# Patient Record
Sex: MALE | Race: WHITE | NOT HISPANIC OR LATINO | Employment: FULL TIME | URBAN - METROPOLITAN AREA
[De-identification: names, ages, dates, MRNs, and addresses within clinical notes are randomized per-mention and may not be internally consistent; named-entity substitution may affect disease eponyms.]

---

## 2024-03-25 ENCOUNTER — OFFICE VISIT (OUTPATIENT)
Dept: FAMILY MEDICINE CLINIC | Facility: CLINIC | Age: 54
End: 2024-03-25
Payer: COMMERCIAL

## 2024-03-25 ENCOUNTER — LAB (OUTPATIENT)
Dept: LAB | Facility: HOSPITAL | Age: 54
End: 2024-03-25
Payer: COMMERCIAL

## 2024-03-25 VITALS
SYSTOLIC BLOOD PRESSURE: 136 MMHG | OXYGEN SATURATION: 97 % | TEMPERATURE: 97.8 F | HEIGHT: 72 IN | BODY MASS INDEX: 35.49 KG/M2 | WEIGHT: 262 LBS | HEART RATE: 81 BPM | DIASTOLIC BLOOD PRESSURE: 98 MMHG

## 2024-03-25 DIAGNOSIS — R74.01 ELEVATED ALT MEASUREMENT: ICD-10-CM

## 2024-03-25 DIAGNOSIS — K42.9 UMBILICAL HERNIA WITHOUT OBSTRUCTION AND WITHOUT GANGRENE: ICD-10-CM

## 2024-03-25 DIAGNOSIS — R03.0 ELEVATED BLOOD PRESSURE READING: ICD-10-CM

## 2024-03-25 DIAGNOSIS — R00.2 PALPITATIONS: ICD-10-CM

## 2024-03-25 DIAGNOSIS — D17.9 MULTIPLE LIPOMAS: Primary | ICD-10-CM

## 2024-03-25 PROBLEM — R22.1 NECK MASS: Status: ACTIVE | Noted: 2024-03-25

## 2024-03-25 LAB
ALBUMIN SERPL-MCNC: 4.5 G/DL (ref 3.5–5.2)
ALBUMIN/GLOB SERPL: 1.6 G/DL
ALP SERPL-CCNC: 97 U/L (ref 39–117)
ALT SERPL W P-5'-P-CCNC: 64 U/L (ref 1–41)
ANION GAP SERPL CALCULATED.3IONS-SCNC: 14.4 MMOL/L (ref 5–15)
AST SERPL-CCNC: 30 U/L (ref 1–40)
BILIRUB SERPL-MCNC: 0.5 MG/DL (ref 0–1.2)
BUN SERPL-MCNC: 12 MG/DL (ref 6–20)
BUN/CREAT SERPL: 10.5 (ref 7–25)
CALCIUM SPEC-SCNC: 10 MG/DL (ref 8.6–10.5)
CHLORIDE SERPL-SCNC: 104 MMOL/L (ref 98–107)
CO2 SERPL-SCNC: 25.6 MMOL/L (ref 22–29)
CREAT SERPL-MCNC: 1.14 MG/DL (ref 0.76–1.27)
DEPRECATED RDW RBC AUTO: 40.4 FL (ref 37–54)
EGFRCR SERPLBLD CKD-EPI 2021: 76 ML/MIN/1.73
ERYTHROCYTE [DISTWIDTH] IN BLOOD BY AUTOMATED COUNT: 12.7 % (ref 12.3–15.4)
GLOBULIN UR ELPH-MCNC: 2.8 GM/DL
GLUCOSE SERPL-MCNC: 96 MG/DL (ref 65–99)
HCT VFR BLD AUTO: 46.4 % (ref 37.5–51)
HGB BLD-MCNC: 16 G/DL (ref 13–17.7)
MCH RBC QN AUTO: 30.2 PG (ref 26.6–33)
MCHC RBC AUTO-ENTMCNC: 34.5 G/DL (ref 31.5–35.7)
MCV RBC AUTO: 87.5 FL (ref 79–97)
PLATELET # BLD AUTO: 170 10*3/MM3 (ref 140–450)
PMV BLD AUTO: 11.6 FL (ref 6–12)
POTASSIUM SERPL-SCNC: 4.7 MMOL/L (ref 3.5–5.2)
PROT SERPL-MCNC: 7.3 G/DL (ref 6–8.5)
RBC # BLD AUTO: 5.3 10*6/MM3 (ref 4.14–5.8)
SODIUM SERPL-SCNC: 144 MMOL/L (ref 136–145)
T4 FREE SERPL-MCNC: 1.27 NG/DL (ref 0.93–1.7)
TSH SERPL DL<=0.05 MIU/L-ACNC: 2.41 UIU/ML (ref 0.27–4.2)
WBC NRBC COR # BLD AUTO: 8.15 10*3/MM3 (ref 3.4–10.8)

## 2024-03-25 PROCEDURE — 93000 ELECTROCARDIOGRAM COMPLETE: CPT

## 2024-03-25 PROCEDURE — 84439 ASSAY OF FREE THYROXINE: CPT

## 2024-03-25 PROCEDURE — 85027 COMPLETE CBC AUTOMATED: CPT

## 2024-03-25 PROCEDURE — 80053 COMPREHEN METABOLIC PANEL: CPT

## 2024-03-25 PROCEDURE — 84443 ASSAY THYROID STIM HORMONE: CPT

## 2024-03-25 NOTE — ASSESSMENT & PLAN NOTE
Ordered ultrasound of the soft tissue of neck over CT for less radiation to the patient.  Will refer to general surgeon for lipoma removals.

## 2024-03-25 NOTE — ASSESSMENT & PLAN NOTE
Have ordered ultrasound for evaluation of fatty tumor on posterior left neck.  Would like to make sure there is no neurovascular compromise.  Will refer to general surgery for discussion of possible removal.

## 2024-03-25 NOTE — ASSESSMENT & PLAN NOTE
Could be related to whitecoat hypertension.  Patient advised to monitor at home at least 3-4 times per week.  Patient to notify me if readings greater than 130/80 consistently.  Would like patient to return in 1 month for follow-up regarding blood pressure.  If you begin to have chest pain, shortness of breath patient to seek treatment sooner in the ER.  Verbalized understanding and agreed to the plan.

## 2024-03-25 NOTE — ASSESSMENT & PLAN NOTE
Normal EKG in the office today with irregularities.  With family history of atrial fibrillation in mother would like the patient to see cardiology for evaluation and possible Holter monitor.  Patient to notify me of any new or changing symptoms.

## 2024-03-25 NOTE — ASSESSMENT & PLAN NOTE
Will refer to general surgery for this as well.  Advised patient if he begins to have any pain, redness, swelling, warmth, changes in bowel movements to seek care sooner in the ER.  They verbalized understanding and agreed to this plan.

## 2024-03-25 NOTE — PATIENT INSTRUCTIONS
Exercising to Lose Weight  Getting regular exercise is important for everyone. It is especially important if you are overweight. Being overweight increases your risk of heart disease, stroke, diabetes, high blood pressure, and several types of cancer. Exercising, and reducing the calories you consume, can help you lose weight and improve fitness and health.  Exercise can be moderate or vigorous intensity. To lose weight, most people need to do a certain amount of moderate or vigorous-intensity exercise each week.  How can exercise affect me?  You lose weight when you exercise enough to burn more calories than you eat. Exercise also reduces body fat and builds muscle. The more muscle you have, the more calories you burn. Exercise also:  Improves mood.  Reduces stress and tension.  Improves your overall fitness, flexibility, and endurance.  Increases bone strength.  Moderate-intensity exercise    Moderate-intensity exercise is any activity that gets you moving enough to burn at least three times more energy (calories) than if you were sitting.  Examples of moderate exercise include:  Walking a mile in 15 minutes.  Doing light yard work.  Biking at an easy pace.  Most people should get at least 150 minutes of moderate-intensity exercise a week to maintain their body weight.  Vigorous-intensity exercise  Vigorous-intensity exercise is any activity that gets you moving enough to burn at least six times more calories than if you were sitting. When you exercise at this intensity, you should be working hard enough that you are not able to carry on a conversation.  Examples of vigorous exercise include:  Running.  Playing a team sport, such as football, basketball, and soccer.  Jumping rope.  Most people should get at least 75 minutes a week of vigorous exercise to maintain their body weight.  What actions can I take to lose weight?  The amount of exercise you need to lose weight depends on:  Your age.  The type of  exercise.  Any health conditions you have.  Your overall physical ability.  Talk to your health care provider about how much exercise you need and what types of activities are safe for you.  Nutrition    Make changes to your diet as told by your health care provider or diet and nutrition specialist (dietitian). This may include:  Eating fewer calories.  Eating more protein.  Eating less unhealthy fats.  Eating a diet that includes fresh fruits and vegetables, whole grains, low-fat dairy products, and lean protein.  Avoiding foods with added fat, salt, and sugar.  Drink plenty of water while you exercise to prevent dehydration or heat stroke.  Activity  Choose an activity that you enjoy and set realistic goals. Your health care provider can help you make an exercise plan that works for you.  Exercise at a moderate or vigorous intensity most days of the week.  The intensity of exercise may vary from person to person. You can tell how intense a workout is for you by paying attention to your breathing and heartbeat. Most people will notice their breathing and heartbeat get faster with more intense exercise.  Do resistance training twice each week, such as:  Push-ups.  Sit-ups.  Lifting weights.  Using resistance bands.  Getting short amounts of exercise can be just as helpful as long, structured periods of exercise. If you have trouble finding time to exercise, try doing these things as part of your daily routine:  Get up, stretch, and walk around every 30 minutes throughout the day.  Go for a walk during your lunch break.  Park your car farther away from your destination.  If you take public transportation, get off one stop early and walk the rest of the way.  Make phone calls while standing up and walking around.  Take the stairs instead of elevators or escalators.  Wear comfortable clothes and shoes with good support.  Do not exercise so much that you hurt yourself, feel dizzy, or get very short of breath.  Where to  find more information  [place] Department of Health and Human Services: www.hhs.gov  Centers for Disease Control and Prevention: www.cdc.gov  Contact a health care provider:  Before starting a new exercise program.  If you have questions or concerns about your weight.  If you have a medical problem that keeps you from exercising.  Get help right away if:  You have any of the following while exercising:  Injury.  Dizziness.  Difficulty breathing or shortness of breath that does not go away when you stop exercising.  Chest pain.  Rapid heartbeat.  These symptoms may represent a serious problem that is an emergency. Do not wait to see if the symptoms will go away. Get medical help right away. Call your local emergency services (911 in the [place] Do not drive yourself to the hospital.  Summary  Getting regular exercise is especially important if you are overweight.  Being overweight increases your risk of heart disease, stroke, diabetes, high blood pressure, and several types of cancer.  Losing weight happens when you burn more calories than you eat.  Reducing the amount of calories you eat, and getting regular moderate or vigorous exercise each week, helps you lose weight.  This information is not intended to replace advice given to you by your health care provider. Make sure you discuss any questions you have with your health care provider.  Document Revised: 02/13/2022 Document Reviewed: 02/13/2022  [employer] Patient Education © 2023 [employer]    MyPlate from [employer]    MyPlate is an outline of a general healthy diet based on the Dietary Guidelines for Americans, 8377-2050, from the U.[place]. Department of Agriculture (USDA). It sets guidelines for how much food you should eat from each food group based on your age, sex, and level of physical activity.  What are tips for following MyPlate?  To follow MyPlate recommendations:  Eat a wide variety of fruits and vegetables, grains, and protein foods.  Serve smaller portions and  eat less food throughout the day.  Limit portion sizes to avoid overeating.  Enjoy your food.  Get at least 150 minutes of exercise every week. This is about 30 minutes each day, 5 or more days per week.  It can be difficult to have every meal look like MyPlate. Think about MyPlate as eating guidelines for an entire day, rather than each individual meal.  Fruits and vegetables  Make one half of your plate fruits and vegetables.  Eat many different colors of fruits and vegetables each day.  For a 2,000-calorie daily food plan, eat:  2½ cups of vegetables every day.  2 cups of fruit every day.  1 cup is equal to:  1 cup raw or cooked vegetables.  1 cup raw fruit.  1 medium-sized orange, apple, or banana.  1 cup 100% fruit or vegetable juice.  2 cups raw leafy greens, such as lettuce, spinach, or kale.  ½ cup dried fruit.  Grains  One fourth of your plate should be grains.  Make at least half of the grains you eat each day whole grains.  For a 2,000-calorie daily food plan, eat 6 oz of grains every day.  1 oz is equal to:  1 slice bread.  1 cup cereal.  ½ cup cooked rice, cereal, or pasta.  Protein  One fourth of your plate should be protein.  Eat a wide variety of protein foods, including meat, poultry, fish, eggs, beans, nuts, and tofu.  For a 2,000-calorie daily food plan, eat 5½ oz of protein every day.  1 oz is equal to:  1 oz meat, poultry, or fish.  ¼ cup cooked beans.  1 egg.  ½ oz nuts or seeds.  1 Tbsp peanut butter.  Dairy  Drink fat-free or low-fat (1%) milk.  Eat or drink dairy as a side to meals.  For a 2,000-calorie daily food plan, eat or drink 3 cups of dairy every day.  1 cup is equal to:  1 cup milk, yogurt, cottage cheese, or soy milk (soy beverage).  2 oz processed cheese.  1½ oz natural cheese.  Fats, oils, salt, and sugars  Only small amounts of oils are recommended.  Avoid foods that are high in calories and low in nutritional value (empty calories), like foods high in fat or added  sugars.  Choose foods that are low in salt (sodium). Choose foods that have less than 140 milligrams (mg) of sodium per serving.  Drink water instead of sugary drinks. Drink enough fluid to keep your urine pale yellow.  Where to find support  Work with your health care provider or a dietitian to develop a customized eating plan that is right for you.  Download an kemi (mobile application) to help you track your daily food intake.  Where to find more information  USDA: ChooseMyPlate.gov  Summary  MyPlate is a general guideline for healthy eating from the USDA. It is based on the Dietary Guidelines for Americans, 7785-7352.  In general, fruits and vegetables should take up one half of your plate, grains should take up one fourth of your plate, and protein should take up one fourth of your plate.  This information is not intended to replace advice given to you by your health care provider. Make sure you discuss any questions you have with your health care provider.  Document Revised: 11/08/2021 Document Reviewed: 11/08/2021  [employer] Patient Education © 2023 [employer]

## 2024-03-25 NOTE — PROGRESS NOTES
New Patient Office Visit      Date: 2024   Patient Name: Darrick Grajeda  : 1970   MRN: [id number]     Chief Complaint:    Chief Complaint   Patient presents with    Establish Care     Has fatty tumors around his body. Was supposed to to get them removed in 2019 but his insurance would not cover it at the time. He Is interested in getting them removed now.        History of Present Illness: Darrick Grajeda is a 53 y.o. male who is here today to establish care.  Patient has a past medical history of ADHD, ASD, anxiety and cholelithiasis and depression.  Patient presents with complaints of fatty tumors all over their body since young adulthood.  Patient saw [facility] in 2019 for removal of fatty tumors.  Patient states that they lost insurance due to their employer closing and being sold.  They have had fatty tumors all over the body for most of their life.  Patient states that after they lost insurance they began to develop 1 on their posterior neck.  They state that over time they developed severe headaches, but this was around the same time that they had COVID-19 infection.  Patient states that the headaches are rare and occasion now, but patient does have pain and headaches when they lay directly on their back and compressed on the fatty tumor on the neck.  Denies any radicular symptoms, dizziness, visual changes.  They state that none of the fatty tumors have really ever gone away, but usually they stopped going.  States they are not painful.  Patient states that these run in the family.  Patient states they are ready to have them removed now.  They state that they probably have about a dozen all over the body.  There are some on their back, left upper arm, right forearm and legs.  They state most of them are small, but the one on the left upper arm is rather large and has continued to grow.    Patient also states that they have had an irregular heart rate  "for most of their life.  Their mother also has an irregular heart beat which they state may be atrial fibrillation.  They have never been seen by any cardiologist as it has not caused him any problems.  Denies chest pain, shortness of breath, dizziness, syncope.  They state that it is intermittent and just comes and goes.  They state that sometimes it feels like it will just stop beating for a couple of seconds.  Has never been evaluated due to loss of insurance as well.      Patient states that they do not have a history of any high blood pressure.  Patient has had blood drawn and is donated blood for many years and has never been told his blood pressure was elevated.  Patient states that there is some family history of hypertension.  Patient has also gained about 70 pounds in the past years.  Patient has blood pressure cuff at home that they are able to check with.  No chest pain, shortness of breath today.  Does have some anxiety and states \"people are terrifying people.\"  Patient states they would not be surprised if they did have high blood pressure or if it was related to anxiety at doctor's office.    Patient also has had an umbilical hernia for the past 6 months.  Only abdominal surgery history is a cholecystectomy.  Patient states there is no pain around the hernia.  Denies redness, warmth, swelling, pain.  Denies changes in bowel movements.  Would like to be evaluated for possible hernia repair.    No history of hypertension in the family.  Does have a family history of lipomas and atrial fibrillation.      Subjective      Review of Systems:   Review of Systems   Constitutional:  Negative for chills and fever.   HENT:  Negative for congestion and sore throat.    Respiratory:  Negative for cough, chest tightness, shortness of breath and wheezing.    Cardiovascular:  Negative for chest pain and palpitations.   Gastrointestinal:  Negative for abdominal pain, nausea and vomiting.   Musculoskeletal:  Positive " for neck pain. Negative for joint swelling and neck stiffness.   Skin:  Positive for skin lesions.   Neurological:  Positive for headache. Negative for dizziness, light-headedness, numbness and memory problem.       Past Medical History:   Past Medical History:   Diagnosis Date    ADHD (attention deficit hyperactivity disorder) 2015    Anxiety 1986    Cholelithiasis 2013    Depression 1986    Headache 2021    Visual impairment 1992       Past Surgical History:   Past Surgical History:   Procedure Laterality Date    CHOLECYSTECTOMY  2013       Family History:   Family History   Problem Relation Age of Onset    Anxiety disorder Mother     Arthritis Mother     Depression Mother     Developmental Disability Mother         Autism    Hyperlipidemia Mother     Cancer Father         Blood Cancer    Cancer Maternal Grandfather         Pancreatic Cancer    Depression Maternal Grandfather     Developmental Disability Maternal Grandfather         Autism    Alcohol abuse Maternal Grandmother     Arthritis Maternal Grandmother     Cancer Maternal Grandmother         Breast Cancer    Heart disease Maternal Grandmother         Heart Bypass Surgery    Hyperlipidemia Maternal Grandmother     Stroke Maternal Grandmother     Vision loss Maternal Grandmother     Cancer Maternal Aunt         Breast Cancer    Depression Son     Developmental Disability Son         Autism    Depression Son     Developmental Disability Son         Autism    Drug abuse Maternal Aunt         Multiple Drugs       Social History:   Social History     Socioeconomic History    Marital status:    Tobacco Use    Smoking status: Never    Smokeless tobacco: Never   Vaping Use    Vaping status: Never Used   Substance and Sexual Activity    Alcohol use: Yes     Alcohol/week: 2.0 standard drinks of alcohol     Types: 2 Glasses of wine per week    Drug use: Never    Sexual activity: Yes     Partners: Female     Birth control/protection: I.U.D.       Medications:  "  No current outpatient medications on file.    Allergies:   Allergies   Allergen Reactions    Sertraline Other (See Comments)       Objective     Physical Exam:  Vital Signs:   Vitals:    03/25/24 0810   BP: 136/98   Pulse: 81   Temp: 97.8 °F (36.6 °C)   TempSrc: Oral   SpO2: 97%   Weight: 119 kg (262 lb)   Height: 182.9 cm (72\")     Body mass index is 35.53 kg/m².         Physical Exam  Vitals reviewed.   Constitutional:       Appearance: Normal appearance. He is not ill-appearing.   HENT:      Head: Normocephalic and atraumatic.      Right Ear: Tympanic membrane, ear canal and external ear normal.      Left Ear: Tympanic membrane, ear canal and external ear normal.      Nose: Nose normal.      Mouth/Throat:      Mouth: Mucous membranes are moist.      Pharynx: No posterior oropharyngeal erythema.   Eyes:      Pupils: Pupils are equal, round, and reactive to light.   Neck:        Comments: Small, soft and mobile mass to posterior left neck.  Cardiovascular:      Rate and Rhythm: Normal rate and regular rhythm.      Pulses: Normal pulses.      Heart sounds: Normal heart sounds.   Pulmonary:      Effort: Pulmonary effort is normal.      Breath sounds: Normal breath sounds.   Abdominal:      General: Abdomen is flat. Bowel sounds are normal.   Musculoskeletal:      Cervical back: Normal range of motion. No pain with movement or spinous process tenderness. Normal range of motion.   Skin:     General: Skin is warm.   Neurological:      General: No focal deficit present.      Mental Status: He is alert and oriented to person, place, and time.   Psychiatric:         Mood and Affect: Mood normal.           ECG 12 Lead    Date/Time: 3/25/2024 9:02 AM  Performed by: Christy Espinosa PA-C    Authorized by: Christy Espinosa PA-C  Comparison: not compared with previous ECG   Previous ECG: no previous ECG available  Rhythm: sinus rhythm  Rate: normal  QRS axis: normal    Clinical impression: normal ECG          Results: "   PHQ-9 Total Score: 0            Assessment / Plan      Assessment/Plan:   Diagnoses and all orders for this visit:    1. Multiple lipomas (Primary)  Assessment & Plan:  Have ordered ultrasound for evaluation of fatty tumor on posterior left neck.  Would like to make sure there is no neurovascular compromise.  Will refer to general surgery for discussion of possible removal.      Orders:  -     Comprehensive Metabolic Panel; Future  -     CBC (No Diff); Future  -     US Head Neck Soft Tissue; Future  -     Ambulatory Referral to General Surgery  -     Comprehensive Metabolic Panel  -     CBC (No Diff)    2. Palpitations  Assessment & Plan:  Normal EKG in the office today with irregularities.  With family history of atrial fibrillation in mother would like the patient to see cardiology for evaluation and possible Holter monitor.  Patient to notify me of any new or changing symptoms.    Orders:  -     ECG 12 Lead  -     Comprehensive Metabolic Panel; Future  -     CBC (No Diff); Future  -     T4, Free; Future  -     TSH; Future  -     Comprehensive Metabolic Panel  -     CBC (No Diff)  -     T4, Free  -     TSH    3. Umbilical hernia without obstruction and without gangrene  Assessment & Plan:  Will refer to general surgery for this as well.  Advised patient if he begins to have any pain, redness, swelling, warmth, changes in bowel movements to seek care sooner in the ER.  They verbalized understanding and agreed to this plan.    Orders:  -     Ambulatory Referral to General Surgery    4. Elevated blood pressure reading  Assessment & Plan:  Could be related to whitecoat hypertension.  Patient advised to monitor at home at least 3-4 times per week.  Patient to notify me if readings greater than 130/80 consistently.  Would like patient to return in 1 month for follow-up regarding blood pressure.  If you begin to have chest pain, shortness of breath patient to seek treatment sooner in the ER.  Verbalized understanding  and agreed to the plan.    Orders:  -     ECG 12 Lead  -     Comprehensive Metabolic Panel; Future  -     CBC (No Diff); Future  -     T4, Free; Future  -     TSH; Future  -     Comprehensive Metabolic Panel  -     CBC (No Diff)  -     T4, Free  -     TSH    Other orders  -     Cancel: CT Soft Tissue Neck With & Without Contrast; Future         Follow Up:   Return in about 4 weeks (around 4/22/2024) for Recheck bp.    Christy Espinosa PA-C   [facility]

## 2024-03-26 NOTE — PROGRESS NOTES
One of your liver function tests is slightly elevated. This can be slightly elevated if you have had alcohol recently or Tylenol. Will recheck here in 2 weeks.     Otherwise blood work is normal.

## 2024-03-28 ENCOUNTER — PATIENT ROUNDING (BHMG ONLY) (OUTPATIENT)
Dept: FAMILY MEDICINE CLINIC | Facility: CLINIC | Age: 54
End: 2024-03-28
Payer: COMMERCIAL

## 2024-04-04 ENCOUNTER — HOSPITAL ENCOUNTER (OUTPATIENT)
Dept: ULTRASOUND IMAGING | Facility: HOSPITAL | Age: 54
Discharge: HOME | End: 2024-04-04
Payer: COMMERCIAL

## 2024-04-04 DIAGNOSIS — D17.9 MULTIPLE LIPOMAS: ICD-10-CM

## 2024-04-04 PROCEDURE — 76536 US EXAM OF HEAD AND NECK: CPT

## 2024-06-28 ENCOUNTER — TRANSCRIBE ORDERS (OUTPATIENT)
Dept: LAB | Facility: HOSPITAL | Age: 54
End: 2024-06-28
Payer: COMMERCIAL

## 2024-06-28 ENCOUNTER — LAB (OUTPATIENT)
Dept: LAB | Facility: HOSPITAL | Age: 54
End: 2024-06-28
Payer: COMMERCIAL

## 2024-06-28 DIAGNOSIS — K42.9 UMBILICAL HERNIA WITHOUT OBSTRUCTION OR GANGRENE: Primary | ICD-10-CM

## 2024-06-28 DIAGNOSIS — R74.01 ELEVATED ALT MEASUREMENT: ICD-10-CM

## 2024-06-28 DIAGNOSIS — K42.9 UMBILICAL HERNIA WITHOUT OBSTRUCTION OR GANGRENE: ICD-10-CM

## 2024-06-28 DIAGNOSIS — R74.01 ELEVATED ALT MEASUREMENT: Primary | ICD-10-CM

## 2024-06-28 LAB
ALBUMIN SERPL-MCNC: 4.4 G/DL (ref 3.5–5.2)
ALBUMIN/GLOB SERPL: 1.5 G/DL
ALP SERPL-CCNC: 100 U/L (ref 39–117)
ALT SERPL W P-5'-P-CCNC: 58 U/L (ref 1–41)
ANION GAP SERPL CALCULATED.3IONS-SCNC: 10 MMOL/L (ref 5–15)
AST SERPL-CCNC: 28 U/L (ref 1–40)
BILIRUB SERPL-MCNC: 0.7 MG/DL (ref 0–1.2)
BUN SERPL-MCNC: 14 MG/DL (ref 6–20)
BUN/CREAT SERPL: 14.9 (ref 7–25)
CALCIUM SPEC-SCNC: 9.1 MG/DL (ref 8.6–10.5)
CHLORIDE SERPL-SCNC: 103 MMOL/L (ref 98–107)
CO2 SERPL-SCNC: 27 MMOL/L (ref 22–29)
CREAT SERPL-MCNC: 0.94 MG/DL (ref 0.76–1.27)
DEPRECATED RDW RBC AUTO: 39.6 FL (ref 37–54)
EGFRCR SERPLBLD CKD-EPI 2021: 95.1 ML/MIN/1.73
ERYTHROCYTE [DISTWIDTH] IN BLOOD BY AUTOMATED COUNT: 12.5 % (ref 12.3–15.4)
GLOBULIN UR ELPH-MCNC: 2.9 GM/DL
GLUCOSE SERPL-MCNC: 92 MG/DL (ref 65–99)
HCT VFR BLD AUTO: 47.3 % (ref 37.5–51)
HGB BLD-MCNC: 16.3 G/DL (ref 13–17.7)
MCH RBC QN AUTO: 30.1 PG (ref 26.6–33)
MCHC RBC AUTO-ENTMCNC: 34.5 G/DL (ref 31.5–35.7)
MCV RBC AUTO: 87.4 FL (ref 79–97)
PLATELET # BLD AUTO: 174 10*3/MM3 (ref 140–450)
PMV BLD AUTO: 11.6 FL (ref 6–12)
POTASSIUM SERPL-SCNC: 4.3 MMOL/L (ref 3.5–5.2)
PROT SERPL-MCNC: 7.3 G/DL (ref 6–8.5)
RBC # BLD AUTO: 5.41 10*6/MM3 (ref 4.14–5.8)
SODIUM SERPL-SCNC: 140 MMOL/L (ref 136–145)
WBC NRBC COR # BLD AUTO: 7.5 10*3/MM3 (ref 3.4–10.8)

## 2024-06-28 PROCEDURE — 85027 COMPLETE CBC AUTOMATED: CPT

## 2024-06-28 PROCEDURE — 36415 COLL VENOUS BLD VENIPUNCTURE: CPT
